# Patient Record
Sex: FEMALE | Race: BLACK OR AFRICAN AMERICAN
[De-identification: names, ages, dates, MRNs, and addresses within clinical notes are randomized per-mention and may not be internally consistent; named-entity substitution may affect disease eponyms.]

---

## 2020-07-31 ENCOUNTER — HOSPITAL ENCOUNTER (OUTPATIENT)
Dept: HOSPITAL 19 - COL.RAD | Age: 65
End: 2020-07-31
Attending: INTERNAL MEDICINE
Payer: COMMERCIAL

## 2020-07-31 DIAGNOSIS — R14.0: Primary | ICD-10-CM

## 2020-07-31 PROCEDURE — A9541 TC99M SULFUR COLLOID: HCPCS

## 2022-08-20 ENCOUNTER — HOSPITAL ENCOUNTER (OUTPATIENT)
Dept: HOSPITAL 19 - COL.ER | Age: 67
Setting detail: OBSERVATION
LOS: 3 days | Discharge: HOME | End: 2022-08-23
Attending: INTERNAL MEDICINE | Admitting: INTERNAL MEDICINE
Payer: MEDICARE

## 2022-08-20 VITALS — SYSTOLIC BLOOD PRESSURE: 139 MMHG | TEMPERATURE: 98.7 F | HEART RATE: 72 BPM | DIASTOLIC BLOOD PRESSURE: 62 MMHG

## 2022-08-20 VITALS — BODY MASS INDEX: 37.05 KG/M2 | HEIGHT: 62.01 IN | WEIGHT: 203.93 LBS

## 2022-08-20 DIAGNOSIS — Z79.899: ICD-10-CM

## 2022-08-20 DIAGNOSIS — K21.9: ICD-10-CM

## 2022-08-20 DIAGNOSIS — E78.5: ICD-10-CM

## 2022-08-20 DIAGNOSIS — E87.6: ICD-10-CM

## 2022-08-20 DIAGNOSIS — I10: ICD-10-CM

## 2022-08-20 DIAGNOSIS — K76.9: ICD-10-CM

## 2022-08-20 DIAGNOSIS — R59.0: Primary | ICD-10-CM

## 2022-08-20 LAB
ALBUMIN SERPL-MCNC: 3.8 GM/DL (ref 3.4–4.8)
ALP SERPL-CCNC: 80 U/L (ref 40–150)
ALT SERPL-CCNC: 24 U/L (ref 0–55)
ANION GAP SERPL CALC-SCNC: 14 MMOL/L (ref 7–16)
AST SERPL-CCNC: 20 U/L (ref 5–34)
BILIRUB SERPL-MCNC: 0.3 MG/DL (ref 0.2–1.2)
BUN SERPL-MCNC: 9 MG/DL (ref 10–20)
CALCIUM SERPL-MCNC: 9.8 MG/DL (ref 8.4–10.2)
CHLORIDE SERPL-SCNC: 101 MMOL/L (ref 98–107)
CO2 SERPL-SCNC: 26 MMOL/L (ref 23–31)
CREAT SERPL-SCNC: 0.78 MG/DL (ref 0.57–1.11)
CRP SERPL-MCNC: 0.9 MG/DL (ref 0–0.5)
EOSINOPHIL NFR BLD: 3 % (ref 0–4)
ERYTHROCYTE [DISTWIDTH] IN BLOOD BY AUTOMATED COUNT: 14.3 % (ref 11.5–14.5)
GLUCOSE SERPL-MCNC: 91 MG/DL (ref 70–99)
HCT VFR BLD AUTO: 45.2 % (ref 37–47)
HGB BLD-MCNC: 15.1 G/DL (ref 12.5–16)
LYMPHOCYTES NFR BLD MANUAL: 43 % (ref 20–51)
MCH RBC QN AUTO: 30 PG (ref 27–31)
MCHC RBC AUTO-ENTMCNC: 33 G/DL (ref 33–37)
MCV RBC AUTO: 89 FL (ref 80–100)
MONOCYTES NFR BLD: 3 % (ref 1.7–9.3)
NEUTS BAND NFR BLD: 1 % (ref 0–10)
NEUTS SEG NFR BLD MANUAL: 50 % (ref 42–75.2)
PLATELET # BLD AUTO: 209 K/MM3 (ref 130–400)
PLATELET BLD QL SMEAR: NORMAL
PMV BLD AUTO: 11.3 FL (ref 7.4–10.4)
POTASSIUM SERPL-SCNC: 3.4 MMOL/L (ref 3.5–4.5)
PROT SERPL-MCNC: 7.7 GM/DL (ref 6.2–8.1)
RBC # BLD AUTO: 5.11 M/MM3 (ref 4.1–5.3)
SODIUM SERPL-SCNC: 141 MMOL/L (ref 136–145)
TROPONIN I SERPL-MCNC: 0.01 NG/ML (ref 0–0.03)

## 2022-08-20 PROCEDURE — G0378 HOSPITAL OBSERVATION PER HR: HCPCS

## 2022-08-20 NOTE — NUR
Exp wheezes before and after tx, heard loudest in the upper throat area and
unchanged by tx.  Tx given via mouthpiece, tolerated well.

## 2022-08-21 VITALS — HEART RATE: 79 BPM | SYSTOLIC BLOOD PRESSURE: 145 MMHG | TEMPERATURE: 98.4 F | DIASTOLIC BLOOD PRESSURE: 63 MMHG

## 2022-08-21 VITALS — HEART RATE: 74 BPM | TEMPERATURE: 99.1 F | SYSTOLIC BLOOD PRESSURE: 139 MMHG | DIASTOLIC BLOOD PRESSURE: 66 MMHG

## 2022-08-21 VITALS — TEMPERATURE: 97.7 F | DIASTOLIC BLOOD PRESSURE: 63 MMHG | HEART RATE: 82 BPM | SYSTOLIC BLOOD PRESSURE: 144 MMHG

## 2022-08-21 VITALS — TEMPERATURE: 97.7 F | SYSTOLIC BLOOD PRESSURE: 144 MMHG | HEART RATE: 67 BPM | DIASTOLIC BLOOD PRESSURE: 62 MMHG

## 2022-08-21 VITALS — DIASTOLIC BLOOD PRESSURE: 64 MMHG | TEMPERATURE: 99 F | HEART RATE: 62 BPM | SYSTOLIC BLOOD PRESSURE: 141 MMHG

## 2022-08-21 VITALS — HEART RATE: 68 BPM | TEMPERATURE: 98.7 F | DIASTOLIC BLOOD PRESSURE: 69 MMHG | SYSTOLIC BLOOD PRESSURE: 134 MMHG

## 2022-08-21 LAB
ANION GAP SERPL CALC-SCNC: 16 MMOL/L (ref 7–16)
BUN SERPL-MCNC: 11 MG/DL (ref 10–20)
CALCIUM SERPL-MCNC: 9.9 MG/DL (ref 8.4–10.2)
CHLORIDE SERPL-SCNC: 99 MMOL/L (ref 98–107)
CO2 SERPL-SCNC: 26 MMOL/L (ref 23–31)
CREAT SERPL-SCNC: 0.89 MG/DL (ref 0.57–1.11)
ERYTHROCYTE [DISTWIDTH] IN BLOOD BY AUTOMATED COUNT: 14.3 % (ref 11.5–14.5)
GLUCOSE SERPL-MCNC: 132 MG/DL (ref 70–99)
HCT VFR BLD AUTO: 45.4 % (ref 37–47)
HGB BLD-MCNC: 15.5 G/DL (ref 12.5–16)
LYMPHOCYTES NFR BLD MANUAL: 24 % (ref 20–51)
MCH RBC QN AUTO: 30 PG (ref 27–31)
MCHC RBC AUTO-ENTMCNC: 34 G/DL (ref 33–37)
MCV RBC AUTO: 87 FL (ref 80–100)
MONOCYTES NFR BLD: 3 % (ref 1.7–9.3)
NEUTS BAND NFR BLD: 4 % (ref 0–10)
NEUTS SEG NFR BLD MANUAL: 69 % (ref 42–75.2)
PLATELET # BLD AUTO: 218 K/MM3 (ref 130–400)
PLATELET BLD QL SMEAR: NORMAL
PMV BLD AUTO: 11.7 FL (ref 7.4–10.4)
POTASSIUM SERPL-SCNC: 3.6 MMOL/L (ref 3.5–4.5)
RBC # BLD AUTO: 5.23 M/MM3 (ref 4.1–5.3)
SODIUM SERPL-SCNC: 141 MMOL/L (ref 136–145)

## 2022-08-21 NOTE — NUR
Pt alert and oriented, follows commands. Pt admitted to room, oriented pt and
family member. Admission intake and admission assessment performed. Med rx
reviewed and confirmed. Allergies confirmed. COVID and infectious disease
assessments completed.  Nourishment provided. VS stable. On room air.
Afebrile. Pt did have an emesis episode this evening around 2200. Pt stating
"I felt nauseas, it wasn't from me coughing. I got up to go to what I thought
was the bathroom and threw up". Pt had a large amount of emesis in the sink
outside of the bathroom. Sink was cleaned and santized. Pt reported "feeling
better" after vomiting. Prn zofran was administered per orders. No other
vomiting has occurred. Provider aware.  Pt tolerated PO medications after
emesis episode, with no nausea reported. Pt has expiratory/inspiratory
wheezing. Pt's RR increases when OOB, appears SOB with ambulation. IV antibx
administered per orders. Medications administered per orders and education
provided. No significant skin issues noted. VS stable. On room air. Bed low
and locked, call bell within reach. Telemetry on. No new concerns at this
time.

## 2022-08-21 NOTE — NUR
PT RESTING IN BED. MORNING MEDICATIONS GIVEN. SHIFT ASSESSMENT COMPLETED. PT
DENIES ANY PAIN OR NEEDS. CURRENTLY BREATHING ON ROOM AIR. DENIES ANY SOB WITH
REST, REPORTS SOB WHEN AMBULATING OR COUGHING. WILL CONTINUE TO MONITOR.

## 2022-08-21 NOTE — NUR
SW met with patient to complete intake. Patient lives in [facility] with
 Alistair Rios [phone]. Patient does not utilize DME, is
independent with ADL's and does not utilize home health services. PCP is Dr. Duvall and pharmacy is [facility]. Patient provides that her  has been
appointed as her DPOA/HC, and plans to return to her home upon DC.
 
DC plan: home

## 2022-08-21 NOTE — NUR
No adverse events overnight. No more emesis episodes overnight. Pt denies
nausea this morning. Pt reports some pain with cough, but states it has
improved. Pt reported neck pain this morning. Pt was pulled up in bed and
repositioned and reported relief. Up to void overnight. VS stable. On room
air. Afebrile. Temp max 99.1. Pt continues to have a dry cough. Pt states she
will occasionally have sputum, but has not been able to cough any up for a
sputum culture. IV antibx administered per orders. Telemetry on. Bed low and
locked, call bell within reach. No new concerns at this time.

## 2022-08-22 VITALS — SYSTOLIC BLOOD PRESSURE: 126 MMHG | DIASTOLIC BLOOD PRESSURE: 50 MMHG | HEART RATE: 69 BPM | TEMPERATURE: 97.9 F

## 2022-08-22 VITALS — HEART RATE: 59 BPM | TEMPERATURE: 98 F | DIASTOLIC BLOOD PRESSURE: 63 MMHG | SYSTOLIC BLOOD PRESSURE: 130 MMHG

## 2022-08-22 VITALS — SYSTOLIC BLOOD PRESSURE: 144 MMHG | HEART RATE: 73 BPM | TEMPERATURE: 97.5 F | DIASTOLIC BLOOD PRESSURE: 71 MMHG

## 2022-08-22 VITALS — TEMPERATURE: 97.6 F | SYSTOLIC BLOOD PRESSURE: 140 MMHG | HEART RATE: 62 BPM | DIASTOLIC BLOOD PRESSURE: 57 MMHG

## 2022-08-22 VITALS — DIASTOLIC BLOOD PRESSURE: 53 MMHG | SYSTOLIC BLOOD PRESSURE: 123 MMHG | TEMPERATURE: 97.7 F | HEART RATE: 70 BPM

## 2022-08-22 VITALS — DIASTOLIC BLOOD PRESSURE: 60 MMHG | TEMPERATURE: 97.7 F | HEART RATE: 63 BPM | SYSTOLIC BLOOD PRESSURE: 146 MMHG

## 2022-08-22 NOTE — NUR
THE PATIENT IS LAYING IN BED AT THIS TIME. PT STATES THAT SHE HAD BEEN SITTING
IN THE CHAIR FOR THE MAJORITY OF THE DAY. PT DENIES ANY NEEDS. NO OTHER
CONCERNS AT THIS TIME.

## 2022-08-22 NOTE — NUR
Shila:  Jain
Situation:  went by room on rounds
Background:  Pt was resting and content in chair
Assessment:  Pt has no needs right now.
Recommendation:   will follow up as needed

## 2022-08-22 NOTE — NUR
PT SITTING UP IN ROOM. MORNING MEDICATIONS GIVEN. SHIFT ASSESSMENT COMPLETED.
PT REPORTS BREATHING FEELS IMPROVED. CURRENTLY ON ROOM AIR. SOME WHEEZING
NOTED TO LOWER LOBES BILATERALLY. DENIES ANY PAIN OR NEEDS. UPDATED ON POC.
WILL CONTINUE TO MONITOR.

## 2022-08-23 VITALS — HEART RATE: 60 BPM | SYSTOLIC BLOOD PRESSURE: 155 MMHG | DIASTOLIC BLOOD PRESSURE: 63 MMHG | TEMPERATURE: 97.8 F

## 2022-08-23 VITALS — HEART RATE: 71 BPM | TEMPERATURE: 97.7 F | DIASTOLIC BLOOD PRESSURE: 57 MMHG | SYSTOLIC BLOOD PRESSURE: 146 MMHG

## 2022-08-23 VITALS — HEART RATE: 65 BPM

## 2022-08-23 VITALS — SYSTOLIC BLOOD PRESSURE: 152 MMHG | DIASTOLIC BLOOD PRESSURE: 71 MMHG | TEMPERATURE: 97.9 F | HEART RATE: 59 BPM

## 2022-08-23 VITALS — HEART RATE: 50 BPM | SYSTOLIC BLOOD PRESSURE: 165 MMHG | DIASTOLIC BLOOD PRESSURE: 80 MMHG | TEMPERATURE: 97.5 F

## 2022-08-23 NOTE — NUR
Patient deemed fit for discharge. Discharge education/instructions given. All
questions answered. IV DC'd, catheter intact, no signs of phlebitis. Patient
denies any pain, discomfort, SOA, or further needs at this time. Patient
escorted from building via wheelchair by [facility] Staff. 
transporting home.

## 2022-08-23 NOTE — NUR
Shift assessment completed. Scheduled medications given. VSS. Patient A&O.
Dry cough noted. PRN tessalon perles given. Patient denies any pain,
discomfort, SOA, or further needs at this time. Call light in reach.

## 2023-04-28 ENCOUNTER — HOSPITAL ENCOUNTER (OUTPATIENT)
Dept: HOSPITAL 19 - COL.LAB | Age: 68
End: 2023-04-28
Attending: OTOLARYNGOLOGY
Payer: MEDICARE

## 2023-04-28 DIAGNOSIS — J30.1: Primary | ICD-10-CM
